# Patient Record
Sex: FEMALE | Race: WHITE | NOT HISPANIC OR LATINO | Employment: FULL TIME | URBAN - METROPOLITAN AREA
[De-identification: names, ages, dates, MRNs, and addresses within clinical notes are randomized per-mention and may not be internally consistent; named-entity substitution may affect disease eponyms.]

---

## 2019-01-08 ENCOUNTER — OFFICE VISIT (OUTPATIENT)
Dept: URGENT CARE | Facility: CLINIC | Age: 29
End: 2019-01-08
Payer: COMMERCIAL

## 2019-01-08 VITALS
HEART RATE: 95 BPM | TEMPERATURE: 98.7 F | BODY MASS INDEX: 29.59 KG/M2 | SYSTOLIC BLOOD PRESSURE: 108 MMHG | DIASTOLIC BLOOD PRESSURE: 72 MMHG | HEIGHT: 63 IN | OXYGEN SATURATION: 98 % | RESPIRATION RATE: 16 BRPM | WEIGHT: 167 LBS

## 2019-01-08 DIAGNOSIS — R09.82 PND (POST-NASAL DRIP): Primary | ICD-10-CM

## 2019-01-08 DIAGNOSIS — H69.93 DISORDER OF BOTH EUSTACHIAN TUBES: ICD-10-CM

## 2019-01-08 PROCEDURE — 99214 OFFICE O/P EST MOD 30 MIN: CPT | Performed by: PHYSICIAN ASSISTANT

## 2019-01-08 RX ORDER — FLUTICASONE PROPIONATE 50 MCG
1 SPRAY, SUSPENSION (ML) NASAL DAILY
Qty: 1 BOTTLE | Refills: 0 | Status: SHIPPED | OUTPATIENT
Start: 2019-01-08

## 2019-01-08 RX ORDER — DEXTROAMPHETAMINE SACCHARATE, AMPHETAMINE ASPARTATE, DEXTROAMPHETAMINE SULFATE AND AMPHETAMINE SULFATE 2.5; 2.5; 2.5; 2.5 MG/1; MG/1; MG/1; MG/1
TABLET ORAL
COMMUNITY

## 2019-01-09 NOTE — PROGRESS NOTES
[facility]        NAME: Pierre Jacobs is a 34 y o  female  : 1990    MRN: [id number]  DATE: 2019  TIME: 7:59 PM    Assessment and Plan   PND (post-nasal drip) [R09 82]  1  PND (post-nasal drip)  fluticasone (FLONASE) 50 mcg/act nasal spray   2  Disorder of both eustachian tubes           Patient Instructions   PND with eustachian tube disorder:   -There is no sign of otitis media or bacterial infection on exam  There is mild fluid and effusion of the bilateral ears on exam with PND likely contributing to her cough  Her lungs are CTABL and her pulse ox is 98% on RA  The patient was advised to take fluticasone nasal spray once daily for two weeks as directed  She can also take zrytec of claritin daily  She should use a humidifier by her bed and take steam showers  She was advised to stay well hydrated  Follow up with an ENT is advised if the symptoms worsen or persist        Follow up with PCP in 3-5 days  Proceed to  ER if symptoms worsen  Chief Complaint     Chief Complaint   Patient presents with    Earache     both ears, x 3 days    Cough     continuous cough, states it goes and comes back, x 3 months         History of Present Illness       The patient presents today for a three day hx of bilateral otalgia  She states that she is also experiencing a dry cough x 3 months that has been intermittent  The patient states her symptoms started with a URI and rhinorrhea  She states that she was seen by her PCP approximately three weeks ago and was diagnosed with sinusitis and otitis media  She was treated with a cephalosporin which improved her symptoms until three days ago when she began to experience worsening otalgia  She denies fever, chills, hearing loss, tinnitus, otorrhea, neck pain  She denies dyspnea, wheezing, cp, palpitations, dizziness  She states that she has mild seasonal allergies   She states that she has seen an ENT in the past for recurrent otitis media but did not have tympanostomy tubes placed  She states that she was diagnosed with asthma as a child but has not been given any medications to treat it  Review of Systems   Review of Systems   Constitutional: Negative for activity change, appetite change, chills, diaphoresis, fatigue and fever  HENT: Positive for congestion, ear pain, postnasal drip and rhinorrhea  Negative for ear discharge, facial swelling, hearing loss, sinus pain, sinus pressure, sneezing, sore throat, tinnitus, trouble swallowing and voice change  Respiratory: Positive for cough  Negative for chest tightness, shortness of breath, wheezing and stridor  Cardiovascular: Negative for chest pain, palpitations and leg swelling  Gastrointestinal: Negative for abdominal pain, diarrhea, nausea and vomiting  Musculoskeletal: Negative for arthralgias, joint swelling, myalgias, neck pain and neck stiffness  Skin: Negative for rash  Allergic/Immunologic: Negative for immunocompromised state  Neurological: Negative for dizziness, weakness, light-headedness, numbness and headaches  Hematological: Negative for adenopathy  Current Medications       Current Outpatient Prescriptions:     amphetamine-dextroamphetamine (ADDERALL, 10MG,) 10 mg tablet, Take by mouth, Disp: , Rfl:     fluticasone (FLONASE) 50 mcg/act nasal spray, 1 spray into each nostril daily, Disp: 1 Bottle, Rfl: 0    Current Allergies     Allergies as of 01/08/2019    (No Known Allergies)            The following portions of the patient's history were reviewed and updated as appropriate: allergies, current medications, past family history, past medical history, past social history, past surgical history and problem list      Past Medical History:   Diagnosis Date    Asthma        History reviewed  No pertinent surgical history  Family History   Problem Relation Age of Onset    Hypertension Mother          Medications have been verified          Objective   /72 Pulse 95   Temp 98 7 °F (37 1 °C)   Resp 16   Ht 5' 3" (1 6 m)   Wt 75 8 kg (167 lb)   LMP 12/17/2018   SpO2 98%   BMI 29 58 kg/m²        Physical Exam     Physical Exam   Constitutional: She is oriented to person, place, and time  She appears well-developed and well-nourished  No distress  HENT:   Head: Normocephalic and atraumatic  Right Ear: Hearing, external ear and ear canal normal  No drainage, swelling or tenderness  Tympanic membrane is not scarred, not perforated, not erythematous, not retracted and not bulging  A middle ear effusion is present  No decreased hearing is noted  Left Ear: Hearing, external ear and ear canal normal  No drainage, swelling or tenderness  Tympanic membrane is not scarred, not perforated, not erythematous, not retracted and not bulging  A middle ear effusion is present  No decreased hearing is noted  Nose: Mucosal edema and rhinorrhea present  Right sinus exhibits no maxillary sinus tenderness and no frontal sinus tenderness  Left sinus exhibits no maxillary sinus tenderness and no frontal sinus tenderness  Mouth/Throat: Uvula is midline, oropharynx is clear and moist and mucous membranes are normal  No uvula swelling  No oropharyngeal exudate, posterior oropharyngeal edema, posterior oropharyngeal erythema or tonsillar abscesses  PND noted    Neck: Normal range of motion  Neck supple  Cardiovascular: Normal rate, regular rhythm, S1 normal and S2 normal   Exam reveals no gallop, no S3, no S4, no distant heart sounds and no friction rub  No murmur heard  Pulmonary/Chest: No accessory muscle usage  No tachypnea and no bradypnea  No respiratory distress  She has no decreased breath sounds  She has no wheezes  She has no rhonchi  She has no rales  Lymphadenopathy:     She has no cervical adenopathy  Neurological: She is alert and oriented to person, place, and time  Skin: She is not diaphoretic  Psychiatric: She has a normal mood and affect   Her behavior is normal    Nursing note and vitals reviewed

## 2019-01-09 NOTE — PATIENT INSTRUCTIONS
Eustachian Tube Dysfunction   WHAT YOU NEED TO KNOW:   What is eustachian tube dysfunction? Eustachian tube dysfunction (ETD) is a condition that prevents your eustachian tubes from opening properly  It can also cause them to become blocked  Eustachian tubes connect your middle ear to the back of your nose and throat  These tubes open and allow air to flow in and out when you sneeze, swallow, or yawn  What causes or increases my risk of ETD? ETD may be caused by swelling or buildup of mucus in your eustachian tubes  Allergies, a cold, or sinus infection can increase your risk for ETD  Smoking also increases your risk for ETD  What are the signs and symptoms of ETD? · Fullness or pressure in your ears    · Muffled hearing    · Pain in one or both ears    · Ringing in your ears    · Popping or clicking feeling in your ears    · Trouble keeping your balance  How is ETD diagnosed? Your healthcare provider will ask about your symptoms  He will examine your ears, your nose, and the back of your throat  He may also do a hearing test    How is ETD treated? Your ETD may get better on its own without any treatment  You may need any of the following:  · Exercises  such as swallowing, yawning, or chewing gum may help to open your eustachian tubes  Your healthcare provider may also recommend that you take a deep breath and then blow with your mouth shut and your nostrils pinched closed  · Air pressure devices  push air into your nose and eustachian tubes to help relieve air pressure in your ear  · Treatment for allergies  such as decongestants, antihistamines, and nasal steroids may improve ETD  They may help decrease swelling of the eustachian tubes  · Ear tubes  may help to keep your middle ear open  During this procedure, your healthcare provider will cut a small hole in your eardrum  · A myringotomy  is procedure to make a small cut in your eardrum and suction out fluid from your middle ear  · Tuboplasty  is a procedure to widen your eustachian tubes  When should I contact my healthcare provider? · Your symptoms do not improve or get worse  · You have a fever  · You have any hearing loss  · You have questions or concerns about your condition or care  CARE AGREEMENT:   You have the right to help plan your care  Learn about your health condition and how it may be treated  Discuss treatment options with your caregivers to decide what care you want to receive  You always have the right to refuse treatment  The above information is an  only  It is not intended as medical advice for individual conditions or treatments  Talk to your doctor, nurse or pharmacist before following any medical regimen to see if it is safe and effective for you  © 2017 [address] [facility] Information is for End User's use only and may not be sold, redistributed or otherwise used for commercial purposes  All illustrations and images included in CareNotes® are the copyrighted property of [employer]  or [employer]  Postnasal Drip   AMBULATORY CARE:   Postnasal drip  is a condition that causes a large amount of mucus to collect in your throat or nose  It may also be called upper airway cough syndrome because the mucus causes repeated coughing  You may have a sore throat, or throat tissues may swell  This may feel like a lump in your throat  You may also feel like you need to clear your throat often  Contact your healthcare provider if:   · You have trouble breathing because of the mucus  · You have new or worsening symptoms, even with treatment  · You have signs of an infection, such as yellow or green mucus, or a fever  · You have questions or concerns about your condition or care  Treatment  may include any of the following:  · Medicines  may be given to thin the mucus   You may need to swallow the medicine or use a device to flush your sinuses with liquid squirted into your nose  Nasal sprays may also be needed to keep the tissues in your nose moist  Medicines can also relieve congestion  Allergy medicine may help if your symptoms are caused by seasonal allergies, such as hay fever  You may need medicine to help control GERD  · Antibiotics  may be needed to treat a bacterial infection  Manage postnasal drip:   · Use a humidifier or vaporizer  Use a cool mist humidifier or a vaporizer to increase air moisture in your home  This may make it easier for you to breathe  · Drink more liquids as directed  Liquids help keep your air passages moist and help you cough up mucus  Ask how much liquid to drink each day and which liquids are best for you  · Avoid cold air and dry, heated air  Cold or dry air can trigger postnasal drip  Try to stay inside on cold days, or keep your mouth covered  Do not stay long in areas that have dry, heated air  · Do not smoke, and avoid secondhand smoke  Nicotine and other chemicals in cigarettes and cigars can irritate your throat and make coughing worse  Ask your healthcare provider for information if you currently smoke and need help to quit  E-cigarettes or smokeless tobacco still contain nicotine  Talk to your healthcare provider before you use these products  Follow up with your healthcare provider as directed:  Write down your questions so you remember to ask them during your visits  © 2017 [address] [employer] Information is for End User's use only and may not be sold, redistributed or otherwise used for commercial purposes  All illustrations and images included in CareNotes® are the copyrighted property of [employer]  or [employer]  The above information is an  only  It is not intended as medical advice for individual conditions or treatments  Talk to your doctor, nurse or pharmacist before following any medical regimen to see if it is safe and effective for you      PND with eustachian tube disorder:   -There is no sign of otitis media or bacterial infection on exam  There is mild fluid and effusion of the bilateral ears on exam with PND likely contributing to her cough  Her lungs are CTABL and her pulse ox is 98% on RA  The patient was advised to take fluticasone nasal spray once daily for two weeks as directed  She can also take zrytec of claritin daily  She should use a humidifier by her bed and take steam showers  She was advised to stay well hydrated   Follow up with an ENT is advised if the symptoms worsen or persist

## 2020-02-24 ENCOUNTER — OFFICE VISIT (OUTPATIENT)
Dept: URGENT CARE | Facility: CLINIC | Age: 30
End: 2020-02-24
Payer: COMMERCIAL

## 2020-02-24 VITALS
BODY MASS INDEX: 28.71 KG/M2 | HEART RATE: 86 BPM | WEIGHT: 168.2 LBS | HEIGHT: 64 IN | DIASTOLIC BLOOD PRESSURE: 70 MMHG | OXYGEN SATURATION: 100 % | TEMPERATURE: 97.9 F | SYSTOLIC BLOOD PRESSURE: 102 MMHG | RESPIRATION RATE: 18 BRPM

## 2020-02-24 DIAGNOSIS — J02.9 SORE THROAT: Primary | ICD-10-CM

## 2020-02-24 LAB — S PYO AG THROAT QL: NEGATIVE

## 2020-02-24 PROCEDURE — 87070 CULTURE OTHR SPECIMN AEROBIC: CPT | Performed by: PHYSICIAN ASSISTANT

## 2020-02-24 PROCEDURE — 87880 STREP A ASSAY W/OPTIC: CPT | Performed by: PHYSICIAN ASSISTANT

## 2020-02-24 PROCEDURE — 99213 OFFICE O/P EST LOW 20 MIN: CPT | Performed by: PHYSICIAN ASSISTANT

## 2020-02-24 NOTE — PROGRESS NOTES
[facility]        NAME: Oc Burr is a 34 y o  female  : 1990    MRN: [id number]  DATE: 2020  TIME: 6:31 PM    Assessment and Plan   Sore throat [J02 9]  1  Sore throat  POCT rapid strepA    Throat culture         Patient Instructions   Acute Pharyngitis:   -Rapid strep was negative, will send out for culture  Will treat based on culture results as there is no sign of bacterial infection on exam at this time  Call in the next 48 hours for your results  -Warm salt water gargles and tea with honey may provide relief  Stay well hydrated and rest    -You can take Cepacol throat drops or spray for local pain relief of the throat  You can take Advil or Tylenol for fever or pain    -If your symptoms worsen or change follow up with your PCP for a recheck     Follow up with PCP in 3-5 days  Proceed to  ER if symptoms worsen  Chief Complaint     Chief Complaint   Patient presents with    Sore Throat     Pt here ill  1 days pt states  sore throat,  bodyaches,ear pain  Pt used Aleve  History of Present Illness       The patient presents today for a one day hx of pharyngitis, myalgias and bilateral otalgia  She states that she has been taking Aleve for the pain which has improved her pain  She denies fever, chills, headache, dizziness, drooling, neck pain, hearing loss, otorrhea, tinnitus  She denies sick contacts or recent travel  She has good PO intake  Review of Systems   Review of Systems   Constitutional: Negative for activity change, appetite change, chills, diaphoresis, fatigue and fever  HENT: Positive for ear pain and sore throat  Negative for congestion, ear discharge, facial swelling, hearing loss, postnasal drip, rhinorrhea, sinus pressure, sinus pain, sneezing, tinnitus, trouble swallowing and voice change  Respiratory: Negative for cough, chest tightness, shortness of breath, wheezing and stridor      Cardiovascular: Negative for chest pain, palpitations and leg swelling  Gastrointestinal: Negative for abdominal pain, diarrhea, nausea and vomiting  Musculoskeletal: Positive for myalgias  Negative for arthralgias, joint swelling, neck pain and neck stiffness  Skin: Negative for rash  Allergic/Immunologic: Negative for immunocompromised state  Neurological: Negative for dizziness, weakness, light-headedness, numbness and headaches  Hematological: Negative for adenopathy  Current Medications       Current Outpatient Medications:     amphetamine-dextroamphetamine (ADDERALL, 10MG,) 10 mg tablet, Take by mouth, Disp: , Rfl:     fluticasone (FLONASE) 50 mcg/act nasal spray, 1 spray into each nostril daily (Patient not taking: Reported on 2/24/2020), Disp: 1 Bottle, Rfl: 0    Current Allergies     Allergies as of 02/24/2020    (No Known Allergies)            The following portions of the patient's history were reviewed and updated as appropriate: allergies, current medications, past family history, past medical history, past social history, past surgical history and problem list      Past Medical History:   Diagnosis Date    Asthma        Past Surgical History:   Procedure Laterality Date    MOUTH SURGERY         Family History   Problem Relation Age of Onset    Hypertension Mother          Medications have been verified  Objective   /70   Pulse 86   Temp 97 9 °F (36 6 °C) (Tympanic)   Resp 18   Ht 5' 4" (1 626 m)   Wt 76 3 kg (168 lb 3 2 oz)   SpO2 100%   BMI 28 87 kg/m²        Physical Exam     Physical Exam   Constitutional: She is oriented to person, place, and time  She appears well-developed and well-nourished  No distress  HENT:   Head: Normocephalic and atraumatic  Right Ear: Hearing, tympanic membrane, external ear and ear canal normal    Left Ear: Hearing, tympanic membrane, external ear and ear canal normal    Nose: Nose normal  No mucosal edema or rhinorrhea   Right sinus exhibits no maxillary sinus tenderness and no frontal sinus tenderness  Left sinus exhibits no maxillary sinus tenderness and no frontal sinus tenderness  Mouth/Throat: Uvula is midline and mucous membranes are normal  No uvula swelling  Posterior oropharyngeal edema and posterior oropharyngeal erythema present  No oropharyngeal exudate or tonsillar abscesses  Tonsils are 1+ on the right  Tonsils are 1+ on the left  No tonsillar exudate  Neck: Normal range of motion  Neck supple  Cardiovascular: Normal rate, regular rhythm, S1 normal and S2 normal  Exam reveals no gallop, no S3, no S4, no distant heart sounds and no friction rub  No murmur heard  Pulmonary/Chest: No accessory muscle usage  No tachypnea and no bradypnea  No respiratory distress  She has no decreased breath sounds  She has no wheezes  She has no rhonchi  She has no rales  Lymphadenopathy:     She has no cervical adenopathy  Neurological: She is alert and oriented to person, place, and time  Skin: She is not diaphoretic  Psychiatric: She has a normal mood and affect  Her behavior is normal    Nursing note and vitals reviewed

## 2020-02-24 NOTE — PATIENT INSTRUCTIONS
Pharyngitis   WHAT YOU NEED TO KNOW:   Pharyngitis, or sore throat, is inflammation of the tissues and structures in your pharynx (throat)  Pharyngitis is most often caused by bacteria  It may also be caused by a cold or flu virus  Other causes include smoking, allergies, or acid reflux  DISCHARGE INSTRUCTIONS:   Call 911 for any of the following:   · You have trouble breathing or swallowing because your throat is swollen or sore  Return to the emergency department if:   · You are drooling because it hurts too much to swallow  · Your fever is higher than 102? F (39?C) or lasts longer than 3 days  · You are confused  · You taste blood in your throat  Contact your healthcare provider if:   · Your throat pain gets worse  · You have a painful lump in your throat that does not go away after 5 days  · Your symptoms do not improve after 5 days  · You have questions or concerns about your condition or care  Medicines:  Viral pharyngitis will go away on its own without treatment  Your sore throat should start to feel better in 3 to 5 days for both viral and bacterial infections  You may need any of the following:  · Antibiotics  treat a bacterial infection  · NSAIDs , such as ibuprofen, help decrease swelling, pain, and fever  NSAIDs can cause stomach bleeding or kidney problems in certain people  If you take blood thinner medicine, always ask your healthcare provider if NSAIDs are safe for you  Always read the medicine label and follow directions  · Acetaminophen  decreases pain and fever  It is available without a doctor's order  Ask how much to take and how often to take it  Follow directions  Acetaminophen can cause liver damage if not taken correctly  · Take your medicine as directed  Contact your healthcare provider if you think your medicine is not helping or if you have side effects  Tell him or her if you are allergic to any medicine   Keep a list of the medicines, vitamins, and herbs you take  Include the amounts, and when and why you take them  Bring the list or the pill bottles to follow-up visits  Carry your medicine list with you in case of an emergency  Manage your symptoms:   · Gargle salt water  Mix ¼ teaspoon salt in an 8 ounce glass of warm water and gargle  This may help decrease swelling in your throat  · Drink liquids as directed  You may need to drink more liquids than usual  Liquids may help soothe your throat and prevent dehydration  Ask how much liquid to drink each day and which liquids are best for you  · Use a cool-steam humidifier  to help moisten the air in your room and calm your cough  · Soothe your throat  with cough drops, ice, soft foods, or popsicles  Prevent the spread of pharyngitis:  Cover your mouth and nose when you cough or sneeze  Do not share food or drinks  Wash your hands often  Use soap and water  If soap and water are unavailable, use an alcohol based hand   Follow up with your healthcare provider as directed:  Write down your questions so you remember to ask them during your visits  © 2017 [address] [facility] Information is for End User's use only and may not be sold, redistributed or otherwise used for commercial purposes  All illustrations and images included in CareNotes® are the copyrighted property of [employer]  or [employer]  The above information is an  only  It is not intended as medical advice for individual conditions or treatments  Talk to your doctor, nurse or pharmacist before following any medical regimen to see if it is safe and effective for you  Acute Pharyngitis:   -Rapid strep was negative, will send out for culture  Will treat based on culture results as there is no sign of bacterial infection on exam at this time  Call in the next 48 hours for your results  -Warm salt water gargles and tea with honey may provide relief   Stay well hydrated and rest    -You can take Cepacol throat drops or spray for local pain relief of the throat   You can take Advil or Tylenol for fever or pain    -If your symptoms worsen or change follow up with your PCP for a recheck

## 2020-02-26 LAB — BACTERIA THROAT CULT: NORMAL
